# Patient Record
Sex: MALE | Race: WHITE
[De-identification: names, ages, dates, MRNs, and addresses within clinical notes are randomized per-mention and may not be internally consistent; named-entity substitution may affect disease eponyms.]

---

## 2018-06-11 ENCOUNTER — HOSPITAL ENCOUNTER (EMERGENCY)
Dept: HOSPITAL 14 - H.ER | Age: 58
Discharge: HOME | End: 2018-06-11
Payer: COMMERCIAL

## 2018-06-11 VITALS
OXYGEN SATURATION: 100 % | TEMPERATURE: 98.1 F | RESPIRATION RATE: 16 BRPM | SYSTOLIC BLOOD PRESSURE: 157 MMHG | DIASTOLIC BLOOD PRESSURE: 89 MMHG

## 2018-06-11 VITALS — HEART RATE: 56 BPM

## 2018-06-11 DIAGNOSIS — H53.8: Primary | ICD-10-CM

## 2018-06-11 LAB
ALBUMIN SERPL-MCNC: 4.4 G/DL (ref 3.5–5)
ALBUMIN/GLOB SERPL: 1.5 {RATIO} (ref 1–2.1)
ALT SERPL-CCNC: 38 U/L (ref 21–72)
APTT BLD: 30.3 SECONDS (ref 25.6–37.1)
AST SERPL-CCNC: 26 U/L (ref 17–59)
BASOPHILS # BLD AUTO: 0 K/UL (ref 0–0.2)
BASOPHILS NFR BLD: 0.4 % (ref 0–2)
BUN SERPL-MCNC: 8 MG/DL (ref 9–20)
CALCIUM SERPL-MCNC: 9.3 MG/DL (ref 8.4–10.2)
EOSINOPHIL # BLD AUTO: 0 K/UL (ref 0–0.7)
EOSINOPHIL NFR BLD: 0.4 % (ref 0–4)
ERYTHROCYTE [DISTWIDTH] IN BLOOD BY AUTOMATED COUNT: 12.5 % (ref 11.5–14.5)
GFR NON-AFRICAN AMERICAN: > 60
HDLC SERPL-MCNC: 65 MG/DL (ref 30–70)
HGB BLD-MCNC: 14.5 G/DL (ref 12–18)
INR PPP: 0.9 (ref 0.9–1.2)
LDLC SERPL-MCNC: 98 MG/DL (ref 0–129)
LYMPHOCYTES # BLD AUTO: 1.3 K/UL (ref 1–4.3)
LYMPHOCYTES NFR BLD AUTO: 20.6 % (ref 20–40)
MCH RBC QN AUTO: 31 PG (ref 27–31)
MCHC RBC AUTO-ENTMCNC: 34.6 G/DL (ref 33–37)
MCV RBC AUTO: 89.6 FL (ref 80–94)
MONOCYTES # BLD: 0.6 K/UL (ref 0–0.8)
MONOCYTES NFR BLD: 10 % (ref 0–10)
NEUTROPHILS # BLD: 4.2 K/UL (ref 1.8–7)
NEUTROPHILS NFR BLD AUTO: 68.6 % (ref 50–75)
NRBC BLD AUTO-RTO: 0.1 % (ref 0–0)
PLATELET # BLD: 211 K/UL (ref 130–400)
PMV BLD AUTO: 9.6 FL (ref 7.2–11.7)
PROTHROMBIN TIME: 10.2 SECONDS (ref 9.8–13.1)
RBC # BLD AUTO: 4.67 MIL/UL (ref 4.4–5.9)
WBC # BLD AUTO: 6.1 K/UL (ref 4.8–10.8)

## 2018-06-11 PROCEDURE — 86850 RBC ANTIBODY SCREEN: CPT

## 2018-06-11 PROCEDURE — 84484 ASSAY OF TROPONIN QUANT: CPT

## 2018-06-11 PROCEDURE — 85610 PROTHROMBIN TIME: CPT

## 2018-06-11 PROCEDURE — 70498 CT ANGIOGRAPHY NECK: CPT

## 2018-06-11 PROCEDURE — 83036 HEMOGLOBIN GLYCOSYLATED A1C: CPT

## 2018-06-11 PROCEDURE — 99284 EMERGENCY DEPT VISIT MOD MDM: CPT

## 2018-06-11 PROCEDURE — 93005 ELECTROCARDIOGRAM TRACING: CPT

## 2018-06-11 PROCEDURE — 86900 BLOOD TYPING SEROLOGIC ABO: CPT

## 2018-06-11 PROCEDURE — 85730 THROMBOPLASTIN TIME PARTIAL: CPT

## 2018-06-11 PROCEDURE — 70496 CT ANGIOGRAPHY HEAD: CPT

## 2018-06-11 PROCEDURE — 70450 CT HEAD/BRAIN W/O DYE: CPT

## 2018-06-11 PROCEDURE — 80053 COMPREHEN METABOLIC PANEL: CPT

## 2018-06-11 PROCEDURE — 71045 X-RAY EXAM CHEST 1 VIEW: CPT

## 2018-06-11 PROCEDURE — 85025 COMPLETE CBC W/AUTO DIFF WBC: CPT

## 2018-06-11 PROCEDURE — 82948 REAGENT STRIP/BLOOD GLUCOSE: CPT

## 2018-06-11 PROCEDURE — 80061 LIPID PANEL: CPT

## 2018-06-11 NOTE — CT
PROCEDURE:  CT Angiography of the Brain and Neck.



HISTORY:

OS blurry vision resolved



COMPARISON:

None available. 



TECHNIQUE:

CT angiography of the intracranial and neck arteries was performed. 

Coronal and sagittal maximum intensity projection reformatted images 

were generated.



Contrast Dose: Visipaque 320, 99 cc



Radiation dose:Total exam DLP = 747.07 mGy-cm.



This CT exam was performed using one or more of the following dose 

reduction techniques: Automated exposure control, adjustment of the 

mA and/or kV according to patient size, and/or use of iterative 

reconstruction technique.



FINDINGS:



INTERNAL CEREBRAL ARTERIES:

Unremarkable. The skull base, petrous, cavernous and supraclinoid 

segments are bilaterally widely patent. 



ANTERIOR CEREBRAL ARTERIES:

Unremarkable. A1 and A2 segments are widely patent. Smaller distal 

branches unremarkable, as visualized.



MIDDLE CEREBRAL ARTERIES:

Unremarkable. M1 and M2 segments are widely patent. Perisylvian 

branches grossly symmetric.



POSTERIOR CIRCULATION:

Basilar Artery: Unremarkable.



Distal Vertebral Arteries: Unremarkable.



Posterior Cerebral Arteries: Unremarkable.



Posterior Inferior Cerebellar Arteries: Unremarkable.



NECK CTA:



Common Carotid arteries: The bilateral common carotid appear widely 

patent from their origins to their bifurcations with no significant 

stenosis appreciated. No evidence to suggest common carotid artery 

dissection.



Internal Carotid arteries: No significant stenosis is appreciated 

throughout the cervical internal carotid artery segments bilaterally 

and there is no evidence of dissection either.



External Carotid arteries: Appear unremarkable bilaterally. 



Vertebral arteries: The bilateral vertebral arteries appear normal in 

caliber from their origins to their junction with the basilar artery. 

No significant stenosis or definite pattern of dissection.  



ANEURYSM/ VASCULAR MALFORMATIONS:

None.



OTHER FINDINGS:

None. 



IMPRESSION:

Unremarkable CT Angiography of the Brain and Neck.

## 2018-06-11 NOTE — CT
PROCEDURE:  CT HEAD WITHOUT CONTRAST.



HISTORY:

code stroke



COMPARISON:

None available. 



TECHNIQUE:

Axial computed tomography images were obtained through the head/brain 

without intravenous contrast.  



Radiation dose:



Total exam DLP = 915.2 mGy-cm.



This CT exam was performed using one or more of the following dose 

reduction techniques: Automated exposure control, adjustment of the 

mA and/or kV according to patient size, and/or use of iterative 

reconstruction technique.



FINDINGS:



HEMORRHAGE:

No intracranial hemorrhage. 



BRAIN:

No mass effect or edema. Mild atrophy. Chronic microvascular ischemic 

changes.



VENTRICLES:

Unremarkable. No hydrocephalus. 



CALVARIUM:

Unremarkable.



PARANASAL SINUSES:

Unremarkable as visualized. No significant inflammatory changes.



MASTOID AIR CELLS:

Unremarkable as visualized. No inflammatory changes.



OTHER FINDINGS:

None.



IMPRESSION:

No acute intracranial pathology.  



Findings conveyed to Dr. Nowak by Dr. Jane at 2:18 p.m. on 

06/11/2018.

## 2018-06-11 NOTE — ED PDOC
HPI:STROKE





- Time


Time: 13:40





- Historian


Historian: Patient





- Onset


Date: 06/11/18


Time: 10:30


Onset: This morning





- Location


Locate left: Eye





- TPA


Positive for Contraindication: Yes





- Notes:


Notes:: 





57-year-old male sent from [facility] for evaluation of acute blurry vision to 

left eye @ 10:30 AM. Pt reports blurry vision lasted about 30 minutes, resolved 

on its own.  Pt evaluated at [facility], given aspirin 81 mg and was sent here. (-

) Eye Pain, (-) Parethesias, (-) numbness, (-) tingling. Pt is ambulating 

without difficulty.








PMD: [facility]











NIHSS Stroke Scale





- Date/Time Evaluation Performed


Date Performed: 06/11/18


Time Performed: 13:40





- How Severe is the Stroke


Level of Consciousness: 0=Alert


LOC to Questions: 0=Both comments correct


LOC to commands: 0=Obeys both correctly


Best Gaze: 0=Normal


Visual: 0=No visual loss


Facial: 0=Normal


Motor Arm - Left: 0=No drift


Motor Arm - Right: 0=No drift


Motor Leg - Left: 0=No drift


Motor Leg - Right: 0=No drift


Limb Ataxia: 0=Absent


Sensory: 0=Normal


Best Language: 0=No aphasia


Dysarthia: 0=Normal articulation


Extinction & Inattention (Neglect): 0=Normal, no object


Score: 0





rTPA Inclusion/Exclusion





- Refusal of Treatment


Patient Refused Treatment: No





- Inclusion Criteria for Altepase


Patient is 18 years or Older: Yes


The Clinical Diagnosis of Ischemic Stroke That is Causing a Potentially 

Disabling Neurological Deficit: No


Time of Onset is Well Established to be Less Than 270 Minute Before Treatment 

Would Begin: No


Risk/Benefit Discussed With Patient/Family Member Present: No





- Warning to TPA With Conditions


Condition: Rapid Improvement





Past Medical History


Reviewed: Historical Data, Nursing Documentation, Vital Signs


Vital Signs: 





 Last Vital Signs











Temp  98.1 F   06/11/18 13:26


 


Pulse  66   06/11/18 13:26


 


Resp  16   06/11/18 13:26


 


BP  157/89 H  06/11/18 13:26


 


Pulse Ox  100   06/11/18 13:26














- Medical History


PMH: No Chronic Diseases





- Surgical History


Surgical History: No Surg Hx





- Family History


Family History: States: Unknown Family Hx





- Social History


Current smoker - smoking cessation education provided: No


Alcohol: Social


Drugs: Denies





- Home Medications


Home Medications: 


 Ambulatory Orders











 Medication  Instructions  Recorded


 


Ascorbic Acid [Vitamin C 500 mg 1 tab PO DAILY 06/11/18





Tab]  


 


Vitamin B Complex [Super B-50 1 cap PO DAILY 06/11/18





Complex]  














- Allergies


Allergies/Adverse Reactions: 


 Allergies











Allergy/AdvReac Type Severity Reaction Status Date / Time


 


No Known Allergies Allergy   Verified 06/11/18 13:31














Review of Systems


ROS Statement: Except As Marked, All Systems Reviewed And Found Negative


Neurological: Negative for: Numbness, Other (paresthesias, tingling)





Physical Exam





- Reviewed


Nursing Documentation Reviewed: Yes


Vital Signs Reviewed: Yes





- Physical Exam


Appears: Positive for: Non-toxic


Head Exam: Positive for: ATRAUMATIC, NORMAL INSPECTION, NORMOCEPHALIC


Skin: Positive for: Normal Color, Warm, Dry


Eye Exam: Positive for: Normal appearance, EOMI, PERRL


ENT: Positive for: Normal ENT Inspection


Neck: Positive for: Normal


Cardiovascular/Chest: Positive for: Regular Rate, Rhythm


Respiratory: Positive for: Normal Breath Sounds.  Negative for: Respiratory 

Distress


Gastrointestinal/Abdominal: Positive for: Normal Exam, Soft.  Negative for: 

Tenderness


Back: Positive for: Normal Inspection


Extremity: Positive for: Normal ROM.  Negative for: Deformity


Neurologic/Psych: Positive for: Alert, Oriented (x 3).  Negative for: Motor/

Sensory Deficits





- Laboratory Results


Result Diagrams: 


 06/11/18 13:53





 06/11/18 13:53





- ECG


ECG Rhythm: Positive for: Sinus Bradycardia.  Negative for: ST/T Changes


Rate: 56


O2 Sat by Pulse Oximetry: 100 (RA)


Pulse Ox Interpretation: Normal





- Core Measure


Core Measure Indicators: Code Stroke





- Critical Care


Total Time (In Min): 45





Medical Decision Making


Medical Decision Making: 





Impression(s): TIA








Time: 13:44


Plan:


- Type and Screen


- CTA Head/Neck Code Stroke


- CT Head w/o (Code Stroke)


- EKG


- CMP


- Hemoglobin A1C


- Lipid Panel


- Troponin I


- CBC (with differentials)


- Partial Thromboplastin Time


- Prothrombin Time


- Portable CXR


- Glucose, POC Routine


- Sodium Chloride 0.9% 1,000 ml  mls/hr











13:44


ED Provider discussed with Dr. Rocha (Neurologist on Call).








14:19 


Radiologist called in for a CT Head read.





Time: 14:19


CT Head w/o Contrast


FINDINGS:





HEMORRHAGE:


No intracranial hemorrhage. 





BRAIN:


No mass effect or edema. Mild atrophy. Chronic microvascular ischemic changes.





VENTRICLES:


Unremarkable. No hydrocephalus. 





CALVARIUM:


Unremarkable.





PARANASAL SINUSES:


Unremarkable as visualized. No significant inflammatory changes.





MASTOID AIR CELLS:


Unremarkable as visualized. No inflammatory changes.





OTHER FINDINGS:


None.





IMPRESSION:


No acute intracranial pathology.  





Findings conveyed to Dr. Nowak by Dr. Jane at 2:18 p.m. on 06/11/2018.














Time: 14:25


CTA Head/Neck Code Stroke


FINDINGS:





INTERNAL CEREBRAL ARTERIES:


Unremarkable. The skull base, petrous, cavernous and supraclinoid segments are 

bilaterally widely patent. 





ANTERIOR CEREBRAL ARTERIES:


Unremarkable. A1 and A2 segments are widely patent. Smaller distal branches 

unremarkable, as visualized.





MIDDLE CEREBRAL ARTERIES:


Unremarkable. M1 and M2 segments are widely patent. Perisylvian branches 

grossly symmetric.





POSTERIOR CIRCULATION:


Basilar Artery: Unremarkable.





Distal Vertebral Arteries: Unremarkable.





Posterior Cerebral Arteries: Unremarkable.





Posterior Inferior Cerebellar Arteries: Unremarkable.





NECK CTA:





Common Carotid arteries: The bilateral common carotid appear widely patent from 

their origins to their bifurcations with no significant stenosis appreciated. 

No evidence to suggest common carotid artery dissection.





Internal Carotid arteries: No significant stenosis is appreciated throughout 

the cervical internal carotid artery segments bilaterally and there is no 

evidence of dissection either.





External Carotid arteries: Appear unremarkable bilaterally. 





Vertebral arteries: The bilateral vertebral arteries appear normal in caliber 

from their origins to their junction with the basilar artery. No significant 

stenosis or definite pattern of dissection.  





ANEURYSM/ VASCULAR MALFORMATIONS:


None.





OTHER FINDINGS:


None. 





IMPRESSION:


Unremarkable CT Angiography of the Brain and Neck.











Upon provider evaluation patient is medically stable, and requires no further 

treatment in the ED at this time. Patient will be discharged. Counseling was 

provided and all questions were answered regarding diagnosis and need for 

follow up with Dr. Maxwell. There is agreement to discharge plan. Return if 

symptoms persist or worsen.














--------------------------------------------------------------------------------

-----------------


Scribe Attestation:


Documented by Addy Damian, acting as a scribe for Roberta Nowak MD.





Provider Scribe Attestation:


All medical record entries made by the Scribe were at my direction and 

personally dictated by me. I have reviewed the chart and agree that the record 

accurately reflects my personal performance of the history, physical exam, 

medical decision making, and the department course for this patient. I have 

also personally directed, reviewed, and agree with the discharge instructions 

and disposition.





Disposition





- Clinical Impression


Clinical Impression: 


 Blurry vision, left eye








- Patient ED Disposition


Is Patient to be Admitted: No





- Disposition


Referrals: 


Jose J Maxwell MD [Staff Provider] - 


Disposition: Routine/Home


Disposition Time: 15:51


Condition: GOOD


Additional Instructions: 


FOLLOW-UP WITH DR. MAXWELL IN HIS OFFICE TOMORROW @ 9 AM. 


Instructions:  Age-Related Vision Loss


Forms:  [employer] (English)

## 2018-06-11 NOTE — RAD
HISTORY:

Code Stroke  



COMPARISON:

No prior. 



FINDINGS:



LUNGS:

No active pulmonary disease.



PLEURA:

No significant pleural effusion identified, no pneumothorax apparent.



CARDIOVASCULAR:

Normal.



OSSEOUS STRUCTURES:

Degenerative changes.



VISUALIZED UPPER ABDOMEN:

Normal.



OTHER FINDINGS:

None.



IMPRESSION:

No active disease.

## 2018-06-12 NOTE — CARD
--------------- APPROVED REPORT --------------





EKG Measurement

Heart [id number]

NH [id number]

[id number]

[id number]

[id number]



<Conclusion>

Sinus bradycardia

Otherwise normal ECG